# Patient Record
Sex: MALE | Race: OTHER | Employment: UNEMPLOYED | URBAN - METROPOLITAN AREA
[De-identification: names, ages, dates, MRNs, and addresses within clinical notes are randomized per-mention and may not be internally consistent; named-entity substitution may affect disease eponyms.]

---

## 2017-08-31 ENCOUNTER — TELEPHONE (OUTPATIENT)
Dept: SURGERY | Facility: CLINIC | Age: 42
End: 2017-08-31

## 2021-08-16 ENCOUNTER — APPOINTMENT (OUTPATIENT)
Dept: CT IMAGING | Facility: HOSPITAL | Age: 46
DRG: 202 | End: 2021-08-16
Attending: EMERGENCY MEDICINE
Payer: COMMERCIAL

## 2021-08-16 ENCOUNTER — HOSPITAL ENCOUNTER (INPATIENT)
Facility: HOSPITAL | Age: 46
LOS: 4 days | Discharge: HOME | DRG: 202 | End: 2021-08-20
Attending: EMERGENCY MEDICINE | Admitting: HOSPITALIST
Payer: COMMERCIAL

## 2021-08-16 ENCOUNTER — APPOINTMENT (OUTPATIENT)
Dept: GENERAL RADIOLOGY | Facility: HOSPITAL | Age: 46
DRG: 202 | End: 2021-08-16
Attending: EMERGENCY MEDICINE
Payer: COMMERCIAL

## 2021-08-16 DIAGNOSIS — R06.00 DYSPNEA, UNSPECIFIED TYPE: Primary | ICD-10-CM

## 2021-08-16 DIAGNOSIS — R77.8 ELEVATED TROPONIN: ICD-10-CM

## 2021-08-16 PROBLEM — D69.6 THROMBOCYTOPENIA (HCC): Status: ACTIVE | Noted: 2021-08-16

## 2021-08-16 PROBLEM — R73.9 HYPERGLYCEMIA: Status: ACTIVE | Noted: 2021-08-16

## 2021-08-16 PROBLEM — E87.1 HYPONATREMIA: Status: ACTIVE | Noted: 2021-08-16

## 2021-08-16 LAB
ALBUMIN SERPL-MCNC: 3.4 G/DL (ref 3.4–5)
ALBUMIN/GLOB SERPL: 0.7 {RATIO} (ref 1–2)
ALP LIVER SERPL-CCNC: 106 U/L
ALT SERPL-CCNC: 58 U/L
ANION GAP SERPL CALC-SCNC: 3 MMOL/L (ref 0–18)
AST SERPL-CCNC: 50 U/L (ref 15–37)
BASOPHILS # BLD AUTO: 0.02 X10(3) UL (ref 0–0.2)
BASOPHILS NFR BLD AUTO: 0.3 %
BILIRUB SERPL-MCNC: 0.5 MG/DL (ref 0.1–2)
BUN BLD-MCNC: 9 MG/DL (ref 7–18)
CALCIUM BLD-MCNC: 9.4 MG/DL (ref 8.5–10.1)
CHLORIDE SERPL-SCNC: 99 MMOL/L (ref 98–112)
CO2 SERPL-SCNC: 31 MMOL/L (ref 21–32)
CREAT BLD-MCNC: 0.82 MG/DL
D-DIMER: 0.56 UG/ML FEU (ref ?–0.5)
EOSINOPHIL # BLD AUTO: 0.03 X10(3) UL (ref 0–0.7)
EOSINOPHIL NFR BLD AUTO: 0.4 %
ERYTHROCYTE [DISTWIDTH] IN BLOOD BY AUTOMATED COUNT: 15.3 %
FLUAV + FLUBV RNA SPEC NAA+PROBE: NEGATIVE
FLUAV + FLUBV RNA SPEC NAA+PROBE: NEGATIVE
GLOBULIN PLAS-MCNC: 4.6 G/DL (ref 2.8–4.4)
GLUCOSE BLD-MCNC: 121 MG/DL (ref 70–99)
HCT VFR BLD AUTO: 43.8 %
HGB BLD-MCNC: 14.5 G/DL
IMM GRANULOCYTES # BLD AUTO: 0.03 X10(3) UL (ref 0–1)
IMM GRANULOCYTES NFR BLD: 0.4 %
LYMPHOCYTES # BLD AUTO: 1.37 X10(3) UL (ref 1–4)
LYMPHOCYTES NFR BLD AUTO: 19.2 %
M PROTEIN MFR SERPL ELPH: 8 G/DL (ref 6.4–8.2)
MCH RBC QN AUTO: 27.3 PG (ref 26–34)
MCHC RBC AUTO-ENTMCNC: 33.1 G/DL (ref 31–37)
MCV RBC AUTO: 82.3 FL
MONOCYTES # BLD AUTO: 0.65 X10(3) UL (ref 0.1–1)
MONOCYTES NFR BLD AUTO: 9.1 %
NEUTROPHILS # BLD AUTO: 5.03 X10 (3) UL (ref 1.5–7.7)
NEUTROPHILS # BLD AUTO: 5.03 X10(3) UL (ref 1.5–7.7)
NEUTROPHILS NFR BLD AUTO: 70.6 %
NT-PROBNP SERPL-MCNC: 108 PG/ML (ref ?–125)
OSMOLALITY SERPL CALC.SUM OF ELEC: 276 MOSM/KG (ref 275–295)
PLATELET # BLD AUTO: 145 10(3)UL (ref 150–450)
POTASSIUM SERPL-SCNC: 3.6 MMOL/L (ref 3.5–5.1)
RBC # BLD AUTO: 5.32 X10(6)UL
RSV RNA SPEC NAA+PROBE: POSITIVE
SARS-COV-2 RNA RESP QL NAA+PROBE: NOT DETECTED
SARS-COV-2 RNA RESP QL NAA+PROBE: NOT DETECTED
SODIUM SERPL-SCNC: 133 MMOL/L (ref 136–145)
TROPONIN I SERPL-MCNC: 0.1 NG/ML (ref ?–0.04)
WBC # BLD AUTO: 7.1 X10(3) UL (ref 4–11)

## 2021-08-16 PROCEDURE — 93010 ELECTROCARDIOGRAM REPORT: CPT

## 2021-08-16 PROCEDURE — 71045 X-RAY EXAM CHEST 1 VIEW: CPT | Performed by: EMERGENCY MEDICINE

## 2021-08-16 PROCEDURE — 80053 COMPREHEN METABOLIC PANEL: CPT | Performed by: EMERGENCY MEDICINE

## 2021-08-16 PROCEDURE — 85025 COMPLETE CBC W/AUTO DIFF WBC: CPT | Performed by: EMERGENCY MEDICINE

## 2021-08-16 PROCEDURE — 99285 EMERGENCY DEPT VISIT HI MDM: CPT

## 2021-08-16 PROCEDURE — 93005 ELECTROCARDIOGRAM TRACING: CPT

## 2021-08-16 PROCEDURE — 96375 TX/PRO/DX INJ NEW DRUG ADDON: CPT

## 2021-08-16 PROCEDURE — 84484 ASSAY OF TROPONIN QUANT: CPT | Performed by: EMERGENCY MEDICINE

## 2021-08-16 PROCEDURE — 87999 UNLISTED MICROBIOLOGY PX: CPT

## 2021-08-16 PROCEDURE — 87502 INFLUENZA DNA AMP PROBE: CPT | Performed by: EMERGENCY MEDICINE

## 2021-08-16 PROCEDURE — 96374 THER/PROPH/DIAG INJ IV PUSH: CPT

## 2021-08-16 PROCEDURE — 85379 FIBRIN DEGRADATION QUANT: CPT | Performed by: EMERGENCY MEDICINE

## 2021-08-16 PROCEDURE — 71260 CT THORAX DX C+: CPT | Performed by: EMERGENCY MEDICINE

## 2021-08-16 PROCEDURE — 87798 DETECT AGENT NOS DNA AMP: CPT | Performed by: EMERGENCY MEDICINE

## 2021-08-16 PROCEDURE — 94640 AIRWAY INHALATION TREATMENT: CPT

## 2021-08-16 PROCEDURE — 83880 ASSAY OF NATRIURETIC PEPTIDE: CPT | Performed by: EMERGENCY MEDICINE

## 2021-08-16 RX ORDER — ALBUTEROL SULFATE 90 UG/1
8 AEROSOL, METERED RESPIRATORY (INHALATION) 4 TIMES DAILY
Status: DISCONTINUED | OUTPATIENT
Start: 2021-08-16 | End: 2021-08-17

## 2021-08-16 RX ORDER — BENZONATATE 100 MG/1
100 CAPSULE ORAL 3 TIMES DAILY PRN
COMMUNITY

## 2021-08-16 RX ORDER — ENOXAPARIN SODIUM 100 MG/ML
0.5 INJECTION SUBCUTANEOUS DAILY
Status: DISCONTINUED | OUTPATIENT
Start: 2021-08-16 | End: 2021-08-20

## 2021-08-16 RX ORDER — BENAZEPRIL/HYDROCHLOROTHIAZIDE 20 MG-25MG
1 TABLET ORAL DAILY
COMMUNITY
End: 2021-09-28

## 2021-08-16 RX ORDER — METHYLPREDNISOLONE SODIUM SUCCINATE 125 MG/2ML
125 INJECTION, POWDER, LYOPHILIZED, FOR SOLUTION INTRAMUSCULAR; INTRAVENOUS ONCE
Status: COMPLETED | OUTPATIENT
Start: 2021-08-16 | End: 2021-08-16

## 2021-08-16 RX ORDER — ALBUTEROL SULFATE 90 UG/1
AEROSOL, METERED RESPIRATORY (INHALATION)
Status: COMPLETED
Start: 2021-08-16 | End: 2021-08-16

## 2021-08-16 RX ORDER — FLUTICASONE PROPIONATE 50 MCG
2 SPRAY, SUSPENSION (ML) NASAL DAILY
Status: DISCONTINUED | OUTPATIENT
Start: 2021-08-17 | End: 2021-08-20

## 2021-08-16 RX ORDER — LORATADINE 10 MG/1
10 TABLET ORAL DAILY
COMMUNITY

## 2021-08-16 RX ORDER — AMLODIPINE BESYLATE 10 MG/1
10 TABLET ORAL DAILY
COMMUNITY
End: 2021-09-28

## 2021-08-16 RX ORDER — ACETAMINOPHEN 325 MG/1
650 TABLET ORAL EVERY 6 HOURS PRN
Status: DISCONTINUED | OUTPATIENT
Start: 2021-08-16 | End: 2021-08-20

## 2021-08-16 RX ORDER — BENAZEPRIL/HYDROCHLOROTHIAZIDE 20 MG-25MG
1 TABLET ORAL DAILY
Status: DISCONTINUED | OUTPATIENT
Start: 2021-08-17 | End: 2021-08-16 | Stop reason: ALTCHOICE

## 2021-08-16 RX ORDER — AMLODIPINE BESYLATE 5 MG/1
10 TABLET ORAL DAILY
Status: DISCONTINUED | OUTPATIENT
Start: 2021-08-17 | End: 2021-08-20

## 2021-08-16 RX ORDER — LABETALOL HYDROCHLORIDE 5 MG/ML
20 INJECTION, SOLUTION INTRAVENOUS ONCE
Status: COMPLETED | OUTPATIENT
Start: 2021-08-16 | End: 2021-08-16

## 2021-08-16 RX ORDER — BENZONATATE 100 MG/1
100 CAPSULE ORAL 3 TIMES DAILY PRN
Status: DISCONTINUED | OUTPATIENT
Start: 2021-08-16 | End: 2021-08-20

## 2021-08-16 RX ORDER — FLUTICASONE PROPIONATE 50 MCG
2 SPRAY, SUSPENSION (ML) NASAL DAILY
COMMUNITY

## 2021-08-16 RX ORDER — CETIRIZINE HYDROCHLORIDE 10 MG/1
10 TABLET ORAL DAILY
Status: DISCONTINUED | OUTPATIENT
Start: 2021-08-17 | End: 2021-08-20

## 2021-08-16 NOTE — ED PROVIDER NOTES
Patient Seen in: [facility] Emergency Department      History   Patient presents with:  Difficulty Breathing    Stated Complaint: sob 94%, 114    HPI/Subjective:   HPI    14-year-old male complaining of difficulty breathing.   Patient is child has RS Course     Labs Reviewed   COMP METABOLIC PANEL (14) - Abnormal; Notable for the following components:       Result Value    Glucose 121 (*)     Sodium 133 (*)     AST 50 (*)     Globulin  4.6 (*)     A/G Ratio 0.7 (*)     All other components within romel Vonnie Crockett MD on 8/16/2021 at 7:21 PM     CBC and Covid are negative      XR CHEST AP PORTABLE  (CPT=71045)    Result Date: 8/16/2021  CONCLUSION:  1. Mild pulmonary venous congestion.  2. Mild bibasilar airspace disease may represent atelectasis wit elevation his blood pressure and a slight elevation in troponin this may be demand ischemia but will be admitted with pulmonary consult blood pressure came down with labetalol and he felt better after neb treatments Solu-Medrol.   There is no pulmonary embo

## 2021-08-16 NOTE — ED INITIAL ASSESSMENT (HPI)
Pt here due to ILENE. Pt stated that his child had Croup and RSV. PT started having a runny nose and congestion starting on Friday. PT starting having some ILENE and fevers on Saturday. Pt is having body aches and cough.

## 2021-08-17 ENCOUNTER — APPOINTMENT (OUTPATIENT)
Dept: CV DIAGNOSTICS | Facility: HOSPITAL | Age: 46
DRG: 202 | End: 2021-08-17
Attending: INTERNAL MEDICINE
Payer: COMMERCIAL

## 2021-08-17 LAB
ANION GAP SERPL CALC-SCNC: 7 MMOL/L (ref 0–18)
ATRIAL RATE: 101 BPM
ATRIAL RATE: 87 BPM
BASOPHILS # BLD: 0 X10(3) UL (ref 0–0.2)
BASOPHILS NFR BLD: 0 %
BUN BLD-MCNC: 12 MG/DL (ref 7–18)
CALCIUM BLD-MCNC: 9 MG/DL (ref 8.5–10.1)
CHLORIDE SERPL-SCNC: 98 MMOL/L (ref 98–112)
CO2 SERPL-SCNC: 28 MMOL/L (ref 21–32)
CREAT BLD-MCNC: 0.88 MG/DL
EOSINOPHIL # BLD: 0 X10(3) UL (ref 0–0.7)
EOSINOPHIL NFR BLD: 0 %
ERYTHROCYTE [DISTWIDTH] IN BLOOD BY AUTOMATED COUNT: 15.3 %
GLUCOSE BLD-MCNC: 149 MG/DL (ref 70–99)
HCT VFR BLD AUTO: 48.7 %
HGB BLD-MCNC: 15.4 G/DL
LYMPHOCYTES NFR BLD: 0.88 X10(3) UL (ref 1–4)
LYMPHOCYTES NFR BLD: 14 %
MCH RBC QN AUTO: 26.6 PG (ref 26–34)
MCHC RBC AUTO-ENTMCNC: 31.6 G/DL (ref 31–37)
MCV RBC AUTO: 84.1 FL
METAMYELOCYTES # BLD: 0.06 X10(3) UL
METAMYELOCYTES NFR BLD: 1 %
MONOCYTES # BLD: 0.06 X10(3) UL (ref 0.1–1)
MONOCYTES NFR BLD: 1 %
MORPHOLOGY: NORMAL
NEUTROPHILS # BLD AUTO: 4.91 X10 (3) UL (ref 1.5–7.7)
NEUTROPHILS NFR BLD: 77 %
NEUTS BAND NFR BLD: 7 %
NEUTS HYPERSEG # BLD: 5.29 X10(3) UL (ref 1.5–7.7)
OSMOLALITY SERPL CALC.SUM OF ELEC: 279 MOSM/KG (ref 275–295)
P AXIS: 49 DEGREES
P AXIS: 64 DEGREES
P-R INTERVAL: 164 MS
P-R INTERVAL: 174 MS
PLATELET # BLD AUTO: 168 10(3)UL (ref 150–450)
PLATELET MORPHOLOGY: NORMAL
POTASSIUM SERPL-SCNC: 3.9 MMOL/L (ref 3.5–5.1)
Q-T INTERVAL: 394 MS
Q-T INTERVAL: 422 MS
QRS DURATION: 142 MS
QRS DURATION: 148 MS
QTC CALCULATION (BEZET): 507 MS
QTC CALCULATION (BEZET): 510 MS
R AXIS: 219 DEGREES
R AXIS: 256 DEGREES
RBC # BLD AUTO: 5.79 X10(6)UL
SODIUM SERPL-SCNC: 133 MMOL/L (ref 136–145)
T AXIS: 12 DEGREES
T AXIS: 15 DEGREES
TOTAL CELLS COUNTED: 100
TROPONIN I SERPL-MCNC: 0.05 NG/ML (ref ?–0.04)
TROPONIN I SERPL-MCNC: 0.06 NG/ML (ref ?–0.04)
VENTRICULAR RATE: 101 BPM
VENTRICULAR RATE: 87 BPM
WBC # BLD AUTO: 6.3 X10(3) UL (ref 4–11)

## 2021-08-17 PROCEDURE — 93005 ELECTROCARDIOGRAM TRACING: CPT

## 2021-08-17 PROCEDURE — 85025 COMPLETE CBC W/AUTO DIFF WBC: CPT | Performed by: HOSPITALIST

## 2021-08-17 PROCEDURE — 85007 BL SMEAR W/DIFF WBC COUNT: CPT | Performed by: HOSPITALIST

## 2021-08-17 PROCEDURE — 93010 ELECTROCARDIOGRAM REPORT: CPT | Performed by: INTERNAL MEDICINE

## 2021-08-17 PROCEDURE — 84484 ASSAY OF TROPONIN QUANT: CPT | Performed by: HOSPITALIST

## 2021-08-17 PROCEDURE — 76937 US GUIDE VASCULAR ACCESS: CPT

## 2021-08-17 PROCEDURE — 85027 COMPLETE CBC AUTOMATED: CPT | Performed by: HOSPITALIST

## 2021-08-17 PROCEDURE — 94640 AIRWAY INHALATION TREATMENT: CPT

## 2021-08-17 PROCEDURE — 36410 VNPNXR 3YR/> PHY/QHP DX/THER: CPT

## 2021-08-17 PROCEDURE — 93306 TTE W/DOPPLER COMPLETE: CPT | Performed by: INTERNAL MEDICINE

## 2021-08-17 PROCEDURE — 80048 BASIC METABOLIC PNL TOTAL CA: CPT | Performed by: HOSPITALIST

## 2021-08-17 RX ORDER — ALBUTEROL SULFATE 90 UG/1
8 AEROSOL, METERED RESPIRATORY (INHALATION) EVERY 4 HOURS PRN
Status: DISCONTINUED | OUTPATIENT
Start: 2021-08-17 | End: 2021-08-20

## 2021-08-17 RX ORDER — IPRATROPIUM BROMIDE AND ALBUTEROL SULFATE 2.5; .5 MG/3ML; MG/3ML
3 SOLUTION RESPIRATORY (INHALATION)
Status: DISCONTINUED | OUTPATIENT
Start: 2021-08-17 | End: 2021-08-19

## 2021-08-17 RX ORDER — METHYLPREDNISOLONE SODIUM SUCCINATE 125 MG/2ML
60 INJECTION, POWDER, LYOPHILIZED, FOR SOLUTION INTRAMUSCULAR; INTRAVENOUS EVERY 8 HOURS
Status: DISCONTINUED | OUTPATIENT
Start: 2021-08-17 | End: 2021-08-19

## 2021-08-17 RX ORDER — CARVEDILOL 6.25 MG/1
6.25 TABLET ORAL 2 TIMES DAILY WITH MEALS
Status: DISCONTINUED | OUTPATIENT
Start: 2021-08-17 | End: 2021-08-17

## 2021-08-17 RX ORDER — DILTIAZEM HYDROCHLORIDE 60 MG/1
60 TABLET, FILM COATED ORAL EVERY 6 HOURS SCHEDULED
Status: DISCONTINUED | OUTPATIENT
Start: 2021-08-17 | End: 2021-08-18

## 2021-08-17 RX ORDER — ASPIRIN 81 MG/1
81 TABLET ORAL DAILY
Status: DISCONTINUED | OUTPATIENT
Start: 2021-08-17 | End: 2021-08-20

## 2021-08-17 NOTE — CONSULTS
40 Heather Rock Patient Status:  Inpatient    1975 MRN [id number]   [facility] 2NE-A Attending Rosa Maria Douglas MD   Hosp Day # 1 PCP Giorgi Umana MD     Date of Admission: 2021  6:24 PM  Admission Diagnosis: Tab, Take 1 tablet by mouth daily. , Disp: , Rfl:          Current Medications:    Current Facility-Administered Medications:   •  Albuterol Sulfate  (90 Base) MCG/ACT inhaler 8 puff, 8 puff, Inhalation, QID  •  amLODIPine besylate (NORVASC) tab 10 m (!) 156/97 98.6 °F (37 °C) Oral 103 18 92 % — —   08/16/21 2337 (!) 150/99 — — — — — — —   08/16/21 2334 152/83 — — — — — — —   08/16/21 2215 — — — 110 16 94 % — —   08/16/21 2000 137/79 — — 78 18 92 % — —   08/16/21 1945 149/88 — — 75 19 93 % — —   08/16/  08/17/2021    CA 9.0 08/17/2021    ALKPHO 106 08/16/2021    ALT 58 08/16/2021    AST 50 08/16/2021    BILT 0.5 08/16/2021    ALB 3.4 08/16/2021    TP 8.0 08/16/2021     No results found for: PT, INR       Imaging: I have independently visualized

## 2021-08-17 NOTE — PAYOR COMM NOTE
--------------  ADMISSION REVIEW     Payor: [facility]  Subscriber #:  [id number]  Authorization Number: [id number]    Admit date: 8/16/21  Admit time: 11:15 PM       REVIEW DOCUMENTATION:     ED Provider Notes      ED Provider Notes signed Resp 16   Ht 177.8 cm (5' 10\")   Wt (!) 203.7 kg   SpO2 94%   BMI 64.42 kg/m²         Physical Exam    Patient is alert and oriented x3 appears mildly dyspneic O2 sat 94% blood pressure 182/120 HEENT exam within normal limits neck is no lymphadenopathy or EKG    Rate, intervals and axes as noted on EKG Report. Rate: 101  Rhythm: Sinus Rhythm  Reading: Right bundle branch block this is an abnormal EKG              XR CHEST AP PORTABLE  (CPT=71045)    Result Date: 8/16/2021  CONCLUSION:  1.  Mild pulmonar factors. For 6-8mm nodules: In low risk patients, CT in 6 to 12 months, then consider CT in 18 to 24 months. In high risk patients, CT in 6 to 12 months, then obtain CT in 18 to 24 months.    Dictated by (CST): Sultana Dolan MD on 8/16/2021 at 10:13 P 100 MG/ML injection 100 mg     Date Action Dose Route User    8/17/2021 0028 Given 100 mg Subcutaneous (Left Lower Abdomen) Chano Cartagena RN      iohexol (OMNIPAQUE) 350 MG/ML injection 100 mL     Date Action Dose Route User    8/16/2021 2142 Given 1

## 2021-08-17 NOTE — PROGRESS NOTES
Assumed care of pt at 0700. Pt up in chair, reports feeling better. Productive cough continues but secretions clear/think-thin. Pulm still to see. Trop elevated- MD notified and EKG done. No other new orders at this time.  Report given to PATIENTS [facility] RN at 1

## 2021-08-17 NOTE — CONSULTS
[facility] Cardiology  Consultation Note      Vanessa Monahan Patient Status:  Inpatient    1975 MRN [id number]   [facility] [place] 2NE-A Attending Laura Swift MD   Hosp Day # 1 PCP Jadiel Patel MD     Reason for consult: Worth mother; Hypertension in his father. Social History   reports that he has never smoked. He has never used smokeless tobacco. He reports current alcohol use. He reports that he does not use drugs.      Allergies    Amoxicillin             HIVES  Ciprofloxa 08/16/2021    TROP 0.054 08/17/2021       Cardiac diagnostics:    EKG 8/17/2021: Sinus rhythm with sinus arrhythmia with occasional Premature ventricular   complexes Right bundle branch block  Inferior infarct , age undetermined       Impression:  1. RSV i

## 2021-08-17 NOTE — PLAN OF CARE
Assumed care of patient at [phone] from ER. Patient AOX4. Requiring 2L NC to maintain O2 sat >90%. Sinus tachycardic on tele. Hypertensive- received labetalol in ER with improvement. Afebrile. Dyspneic with exertion.  Patient c/o congested cough- PRN Tessalon

## 2021-08-17 NOTE — ED QUICK NOTES
Orders for admission, patient is aware of plan and ready to go upstairs. Any questions, please call ED RN Tabitha Veronica at extension [phone]     Vaccinated?  yes  Type of COVID test sent:rapid  COVID Suspicion level: Low/Highcovid test negative      Titratable drug(s

## 2021-08-17 NOTE — BH PROGRESS NOTE
Seen by dr. Latrice Vallecillo  To be administered  RT  Iv solumedrol as ordered  Cardiology  Consult  Seen by dr. Immanuel Sidhu gated coronaries in am no caffeine for 12 hours; cardizem po q 6 hours  2 d echo today

## 2021-08-17 NOTE — PAYOR COMM NOTE
--------------  CONTINUED STAY REVIEW    Payor: [facility]  Subscriber #:  [id number]  Authorization Number: [id number]    Admit date: 8/16/21  Admit time: 11:15 PM    Admitting Physician: Adilson Clayton MD  Attending Physician:  Raj Bowman No 125 mg Intravenous Ray Jones RN            Plan: 8/17  [facility] Hospitalist H&P         CC: Patient presents with:  Difficulty Breathing        PCP: Salvatore Payne MD     History of Present Illness:  Patient is a 56 yo  hx of HTN, family hx of CAD present Dad- passed away at [de-identified], hx of HTN, CAD         Review of Systems  Negative except for above  General: Denies unintentional weight loss, fevers, or chills  HEENT: Denies vision loss or double vision, denies hearing loss  Cardiovascular: Denies chest pain, 08/16/21 1858   ALT 58   AST 50*   ALB 3.4              Recent Labs   Lab 08/16/21 1858 08/17/21  0701   TROP 0.103* 0.055*         Additional Diagnostics: ECG: NSR with PVC     CXR: image personally reviewed negative     Radiology: XR CHEST AP PORTABLE right and mild left hilar adenopathy. A reference measurement of a right hilar lymph node is 2.6 x 2.2 cm. A reference measurement of a left hilar lymph node is 2.3 x 1.2 cm. CARDIAC:  Unremarkable. PLEURA:  Unremarkable.  CHEST WALL:  Left-sided gynecoma -trend trops, get another ekg- further recs pending cards eval   -BP control         # RSV bronchitis   -liekly cause of SOB, cough, improved with nebs and steroids   -no hx of reactive airway disease/asthma   -stop aleena, cont nebs PRN         #HTN

## 2021-08-17 NOTE — IMAGING NOTE
Spoke with Angelika Basurto RN re planned CTA GATED CORONARY possibly 8/18/21  Instruction given to Angelika Basurto RN  Patient will need HR of 60 BPM or less  IV acces antecubital 18G preferably Right  Patient will need to lay flat for minimum of 15 minutes for scan  GFR in

## 2021-08-17 NOTE — H&P
[facility] Hospitalist H&P       CC: Patient presents with:  Difficulty Breathing       PCP: Pio Dixon MD    History of Present Illness:  Patient is a 54 yo morbidly obese ( obesity class III) , hx of HTN, family hx of CAD presents with exertional dyspnea except for above  General: Denies unintentional weight loss, fevers, or chills  HEENT: Denies vision loss or double vision, denies hearing loss  Cardiovascular: Denies chest pain, palpitations, peripheral edema  Pulmonary: Denies cough, shortness of breath 0.055*       Additional Diagnostics: ECG: NSR with PVC    CXR: image personally reviewed negative    Radiology: XR CHEST AP PORTABLE  (CPT=71045)    Result Date: 8/16/2021  PROCEDURE:  XR CHEST AP PORTABLE  (CPT=71045)  TECHNIQUE:  AP chest radiograph was measurement of a left hilar lymph node is 2.3 x 1.2 cm. CARDIAC:  Unremarkable. PLEURA:  Unremarkable. CHEST WALL:  Left-sided gynecomastia. LIMITED ABDOMEN:  Fatty infiltration of the liver. BONES:  Degenerative changes in the spine. OTHER:  None. cough, improved with nebs and steroids   -no hx of reactive airway disease/asthma   -stop sterouds, cont nebs PRN       #HTN   -ccont amlodipine, ace and hydrochlorothiazide   -titrate - goal BP < 120     # Morbid obestiy   -class III BMI 64   -diet and li

## 2021-08-18 LAB
ANION GAP SERPL CALC-SCNC: 7 MMOL/L (ref 0–18)
BASOPHILS # BLD AUTO: 0.02 X10(3) UL (ref 0–0.2)
BASOPHILS NFR BLD AUTO: 0.2 %
BUN BLD-MCNC: 18 MG/DL (ref 7–18)
CALCIUM BLD-MCNC: 9.3 MG/DL (ref 8.5–10.1)
CHLORIDE SERPL-SCNC: 98 MMOL/L (ref 98–112)
CO2 SERPL-SCNC: 31 MMOL/L (ref 21–32)
CREAT BLD-MCNC: 0.8 MG/DL
EOSINOPHIL # BLD AUTO: 0 X10(3) UL (ref 0–0.7)
EOSINOPHIL NFR BLD AUTO: 0 %
ERYTHROCYTE [DISTWIDTH] IN BLOOD BY AUTOMATED COUNT: 15.3 %
GLUCOSE BLD-MCNC: 170 MG/DL (ref 70–99)
HCT VFR BLD AUTO: 46.6 %
HGB BLD-MCNC: 15.4 G/DL
IMM GRANULOCYTES # BLD AUTO: 0.05 X10(3) UL (ref 0–1)
IMM GRANULOCYTES NFR BLD: 0.5 %
LYMPHOCYTES # BLD AUTO: 1.54 X10(3) UL (ref 1–4)
LYMPHOCYTES NFR BLD AUTO: 16.9 %
MCH RBC QN AUTO: 27.2 PG (ref 26–34)
MCHC RBC AUTO-ENTMCNC: 33 G/DL (ref 31–37)
MCV RBC AUTO: 82.2 FL
MONOCYTES # BLD AUTO: 0.16 X10(3) UL (ref 0.1–1)
MONOCYTES NFR BLD AUTO: 1.8 %
NEUTROPHILS # BLD AUTO: 7.35 X10 (3) UL (ref 1.5–7.7)
NEUTROPHILS # BLD AUTO: 7.35 X10(3) UL (ref 1.5–7.7)
NEUTROPHILS NFR BLD AUTO: 80.6 %
OSMOLALITY SERPL CALC.SUM OF ELEC: 288 MOSM/KG (ref 275–295)
PLATELET # BLD AUTO: 191 10(3)UL (ref 150–450)
POTASSIUM SERPL-SCNC: 4 MMOL/L (ref 3.5–5.1)
RBC # BLD AUTO: 5.67 X10(6)UL
SODIUM SERPL-SCNC: 136 MMOL/L (ref 136–145)
WBC # BLD AUTO: 9.1 X10(3) UL (ref 4–11)

## 2021-08-18 PROCEDURE — 94640 AIRWAY INHALATION TREATMENT: CPT

## 2021-08-18 PROCEDURE — 80048 BASIC METABOLIC PNL TOTAL CA: CPT | Performed by: INTERNAL MEDICINE

## 2021-08-18 PROCEDURE — 85025 COMPLETE CBC W/AUTO DIFF WBC: CPT | Performed by: HOSPITALIST

## 2021-08-18 NOTE — PLAN OF CARE
Assumed care of patient at [facility]. Patient AOX4. Requiring 2L NC to maintain O2 sat >90% while awake, 3L NC while asleep. NSR/sinus clarice on tele. VSS. Denies pain. Expiratory wheezes auscultated bilaterally.  Continuing IV Solu-medrol, nebs, PRN albuterol in indicated  - Manage/alleviate anxiety  - Monitor for signs/symptoms of CO2 retention  8/18/2021 0040 by Eliot Ayala RN  Outcome: Progressing  8/18/2021 0039 by Eliot Ayala RN  Outcome: Progressing     Problem: CARDIOVASCULAR - ADULT  Goal:

## 2021-08-18 NOTE — PLAN OF CARE
Denies c/o pain, malaise, or cardiac symptoms, despite 2 sinus pauses this morning.   Remains in SB-NSR, 2 pauses this am.  One at 1013: 3.37 seconds and one at 1019: 5.32 seconds, with a couple of juncitonal escape beats following completely asymptomatic a behavior  - Position to facilitate oxygenation and minimize respiratory effort  - Oxygen supplementation based on oxygen saturation or ABGs  - Provide Smoking Cessation handout, if applicable  - Encourage broncho-pulmonary hygiene including cough, deep liudmila

## 2021-08-18 NOTE — PAYOR COMM NOTE
--------------  CONTINUED STAY REVIEW    Payor: [address] [facility]  Subscriber #:  [id number]  Authorization Number: [id number]    Admit date: 8/16/21  Admit time: 11:15 PM    FAXING CLINICAL UPDATE FOR 8/18/21 8/18/21   SUBJECTIVE:  Sitting up i nodule on CT chest- f/u pulm/pcp outpatient     Prophy:  DVT: lovenox     MEDICATIONS ADMINISTERED IN LAST 1 DAY:  acetaminophen (TYLENOL) tab 650 mg     Date Action Dose Route User    8/17/2021 1757 Given 650 mg Oral KAILEY Neff mL Nebulization Angelia Rojas, RCP      methylPREDNISolone Sodium Succ (Solu-MEDROL) injection 60 mg     Date Action Dose Route User    8/18/2021 1455 Given 60 mg Intravenous Judith Hercules RN    8/18/2021 2062 Given 60 mg Intravenous Robin Alanis RN

## 2021-08-18 NOTE — PROGRESS NOTES
Pulmonary Progress Note      NAME: Emanuel Celis - ROOM: [id number] - MRN: [id number] - Age: 55year old - : 1975    Assessment/Plan:  Cough/SOB: 2/2 RSV with associated bronchospasm  -viral panel with +RSV  -IV steroids, start weaning tomorrow RDW 15.3   NEPRELIM 7.35   WBC 9.1   .0     Recent Labs   Lab 08/16/21  1858 08/17/21  0701 08/18/21  0633   * 149* 170*   BUN 9 12 18   CREATSERUM 0.82 0.88 0.80   GFRAA 123 119 124   GFRNAA 106 103 107   CA 9.4 9.0 9.3   ALB 3.4  --   --

## 2021-08-18 NOTE — PROGRESS NOTES
[facility] Hospitalist Progress Note     PCP: Deb Rondon MD    CC:  Follow up    SUBJECTIVE:  Sitting up in chair, on 2L NC. Breathing better.  No n/v/f/c.     OBJECTIVE:  Temp:  [97.5 °F (36.4 °C)-98.3 °F (36.8 °C)] 97.5 °F (36.4 °C)  Pulse:  [45-84] 69  R mg Oral Daily   • enoxaparin  0.5 mg/kg Subcutaneous Daily   • lisinopril-hydrochlorothiazide (ZESTORETIC 20/25) combination tablet ([facility] only)   Oral Daily       Albuterol Sulfate HFA, benzonatate, acetaminophen       Assessment/Plan:     Principal Problem

## 2021-08-19 LAB
ANION GAP SERPL CALC-SCNC: 5 MMOL/L (ref 0–18)
BASOPHILS # BLD AUTO: 0.02 X10(3) UL (ref 0–0.2)
BASOPHILS NFR BLD AUTO: 0.1 %
BUN BLD-MCNC: 22 MG/DL (ref 7–18)
CALCIUM BLD-MCNC: 9.3 MG/DL (ref 8.5–10.1)
CHLORIDE SERPL-SCNC: 98 MMOL/L (ref 98–112)
CO2 SERPL-SCNC: 31 MMOL/L (ref 21–32)
CREAT BLD-MCNC: 1.11 MG/DL
EOSINOPHIL # BLD AUTO: 0 X10(3) UL (ref 0–0.7)
EOSINOPHIL NFR BLD AUTO: 0 %
ERYTHROCYTE [DISTWIDTH] IN BLOOD BY AUTOMATED COUNT: 15.4 %
EST. AVERAGE GLUCOSE BLD GHB EST-MCNC: 151 MG/DL (ref 68–126)
GLUCOSE BLD-MCNC: 152 MG/DL (ref 70–99)
HAV IGM SER QL: 2.5 MG/DL (ref 1.6–2.6)
HBA1C MFR BLD HPLC: 6.9 % (ref ?–5.7)
HCT VFR BLD AUTO: 49.5 %
HGB BLD-MCNC: 15.7 G/DL
IMM GRANULOCYTES # BLD AUTO: 0.11 X10(3) UL (ref 0–1)
IMM GRANULOCYTES NFR BLD: 0.7 %
LYMPHOCYTES # BLD AUTO: 2.05 X10(3) UL (ref 1–4)
LYMPHOCYTES NFR BLD AUTO: 12.9 %
MCH RBC QN AUTO: 26.8 PG (ref 26–34)
MCHC RBC AUTO-ENTMCNC: 31.7 G/DL (ref 31–37)
MCV RBC AUTO: 84.6 FL
MONOCYTES # BLD AUTO: 0.47 X10(3) UL (ref 0.1–1)
MONOCYTES NFR BLD AUTO: 3 %
NEUTROPHILS # BLD AUTO: 13.23 X10 (3) UL (ref 1.5–7.7)
NEUTROPHILS # BLD AUTO: 13.23 X10(3) UL (ref 1.5–7.7)
NEUTROPHILS NFR BLD AUTO: 83.3 %
OSMOLALITY SERPL CALC.SUM OF ELEC: 284 MOSM/KG (ref 275–295)
PLATELET # BLD AUTO: 225 10(3)UL (ref 150–450)
POTASSIUM SERPL-SCNC: 3.5 MMOL/L (ref 3.5–5.1)
POTASSIUM SERPL-SCNC: 4.5 MMOL/L (ref 3.5–5.1)
RBC # BLD AUTO: 5.85 X10(6)UL
SODIUM SERPL-SCNC: 134 MMOL/L (ref 136–145)
WBC # BLD AUTO: 15.9 X10(3) UL (ref 4–11)

## 2021-08-19 PROCEDURE — 85025 COMPLETE CBC W/AUTO DIFF WBC: CPT | Performed by: HOSPITALIST

## 2021-08-19 PROCEDURE — 80048 BASIC METABOLIC PNL TOTAL CA: CPT | Performed by: HOSPITALIST

## 2021-08-19 PROCEDURE — 94640 AIRWAY INHALATION TREATMENT: CPT

## 2021-08-19 PROCEDURE — 83735 ASSAY OF MAGNESIUM: CPT | Performed by: HOSPITALIST

## 2021-08-19 PROCEDURE — 84132 ASSAY OF SERUM POTASSIUM: CPT | Performed by: HOSPITALIST

## 2021-08-19 PROCEDURE — 83036 HEMOGLOBIN GLYCOSYLATED A1C: CPT | Performed by: INTERNAL MEDICINE

## 2021-08-19 RX ORDER — HYDROCHLOROTHIAZIDE 25 MG/1
25 TABLET ORAL DAILY
Status: DISCONTINUED | OUTPATIENT
Start: 2021-08-19 | End: 2021-08-20

## 2021-08-19 RX ORDER — LISINOPRIL 40 MG/1
40 TABLET ORAL DAILY
Status: DISCONTINUED | OUTPATIENT
Start: 2021-08-19 | End: 2021-08-20

## 2021-08-19 RX ORDER — IPRATROPIUM BROMIDE AND ALBUTEROL SULFATE 2.5; .5 MG/3ML; MG/3ML
3 SOLUTION RESPIRATORY (INHALATION)
Status: DISCONTINUED | OUTPATIENT
Start: 2021-08-19 | End: 2021-08-20

## 2021-08-19 RX ORDER — ASPIRIN 81 MG/1
81 TABLET ORAL DAILY
Qty: 90 TABLET | Refills: 3 | Status: SHIPPED | OUTPATIENT
Start: 2021-08-20

## 2021-08-19 RX ORDER — PREDNISONE 20 MG/1
40 TABLET ORAL
Status: DISCONTINUED | OUTPATIENT
Start: 2021-08-19 | End: 2021-08-20

## 2021-08-19 RX ORDER — POTASSIUM CHLORIDE 20 MEQ/1
40 TABLET, EXTENDED RELEASE ORAL EVERY 4 HOURS
Status: COMPLETED | OUTPATIENT
Start: 2021-08-19 | End: 2021-08-19

## 2021-08-19 NOTE — PROGRESS NOTES
[facility]  Cardiology Progress Note    Edna Trevizo Patient Status:  Inpatient    1975 MRN [id number]   [facility] 2NE-A Attending Allyson Bingham, *   Hosp Day # 2 PCP Hernan Machado MD       Subjective: SOB improved apparent distress. No respiratory or constitutional distress. HEENT: Normocephalic, anicteric sclera  Neck: No JVD  Cardiac: Regular rate and rhythm. S1, S2 normal. No murmur, rubs or gallops. Lungs: Clear without wheezes, rales, rhonchi or dullness.   Dianne Choi the coronary CTA. Consider stress echo once he has recovered from his current respiratory infection. 2. Stop diltiazem, was started to slow HR down for the CCTA, but he has had up to 5 second sinus pauses (while asleep).    3. If recurrent long pauses zita

## 2021-08-19 NOTE — CONSULTS
[facility]    PATIENT'S NAME: Frank Alvarado   ATTENDING PHYSICIAN: Yaz Azevedo. Becca Smyth MD   CONSULTING PHYSICIAN: Rayne Smiley M.D.    PATIENT ACCOUNT#:   [de-identified]    LOCATION:  [address] [place] [address]  MEDICAL RECORD #:   [id number]       DATE OF CONRADO mucosa is moist.  NECK:  Very full. Difficult to evaluate but no carotid bruits. Hard to appreciate JVD. LUNGS:  Diminished breath sounds in the bases and in general coarse breath sounds.   HEART:  Regular rate and rhythm without extra heart sounds or mu

## 2021-08-19 NOTE — PROGRESS NOTES
[facility]  Cardiology Progress Note    Gemini Epp Patient Status:  Inpatient    1975 MRN [id number]   [facility] 2NE-A Attending Kb Betancur, *   Hosp Day # 3 PCP Yung Smith MD       Subjective: SOB improved Alert and oriented x 3. No apparent distress. No respiratory or constitutional distress. Morbidly obese  HEENT: Normocephalic, anicteric sclera  Neck: No discernible JVD  Cardiac: Regular rate and rhythm. S1, S2 normal. No murmur, rubs or gallops.    Lungs: asleep or awake     Recommendations:  1. Suspect minimal troponin elevation is from demand ischemia due to current RSV infection. Too big for Cardiolite. Can't lay flat for the coronary CTA.  Consider stress echo once he has recovered from his current respi

## 2021-08-19 NOTE — PLAN OF CARE
Assumed care of patient 2330 sitting up in chair. AO x4. NSR/SB on tele monitor with occasional pauses. O2 sat adequate on RA. Denies chest pain and shortness of breath. Endorses Xiao and non productive cough. Plan of care updated with patient.  Chair locked decreased cardiac output  - Evaluate effectiveness of vasoactive medications to optimize hemodynamic stability  - Monitor arterial and/or venous puncture sites for bleeding and/or hematoma  - Assess quality of pulses, skin color and temperature  - Assess f

## 2021-08-19 NOTE — PAYOR COMM NOTE
--------------  CONTINUED STAY REVIEW    Payor: [address] [facility]  Subscriber #:  [id number]  Authorization Number: [id number]    Admit date: 8/16/21  Admit time: 11:15 PM    FAXING CLINICAL UPDATE FOR 8/19/21 8/19/21  SUBJECTIVE:  Sitting up i surgery eval as OP  -follow up with pcp  -sleep study outpatient to assess for marquita given body habitus     **incidental finding of hilar adenopathy and pulmonary nodule on CT chest- f/u pulm/pcp outpatient     Prophy:  DVT: lovenox        MEDICATIONS ADMINI 1238 Given 40 mEq Oral Jessa Sandoval RN    8/19/2021 6124 Given 40 mEq Oral Ken Calle, RN      predniSONE (DELTASONE) tab 40 mg     Date Action Dose Route User    8/19/2021 1239 Given 40 mg Oral Jessa Sandoval RN

## 2021-08-19 NOTE — PROGRESS NOTES
[facility] Hospitalist Progress Note     PCP: Carlee Thomason MD    CC:  Follow up    SUBJECTIVE:  Sitting up in chair, on ra. Breathing better. No n/v/f/c.    Had some more pauses this am, thinks he was nodding off while sitting in chair  OBJECTIVE:  Temp:  [id number] • potassium chloride  40 mEq Oral Q4H   • predniSONE  40 mg Oral Daily with breakfast   • aspirin  81 mg Oral Daily   • amLODIPine besylate  10 mg Oral Daily   • Fluticasone Propionate  2 spray Each Nare Daily   • cetirizine  10 mg Oral Daily   • enoxapa

## 2021-08-19 NOTE — PLAN OF CARE
Pt is alert x 4, on Ra, NSR, with occasional pauses on the monitor, cardiology . PT denies any cardiovascular symptoms at this time. Pt is up with SBA, continent of B/B. All needs met and will continue to monitor.      9136: Pt had a 4.01 sec pause, cabrera bleeding, hypotension and signs of decreased cardiac output  - Evaluate effectiveness of vasoactive medications to optimize hemodynamic stability  - Monitor arterial and/or venous puncture sites for bleeding and/or hematoma  - Assess quality of pulses, ski

## 2021-08-19 NOTE — PROGRESS NOTES
40 Heather Arnold Patient Status:  Inpatient    1975 MRN [id number]   [facility] 2NE-A Attending Zenia Diop, *   Hosp Day # 3 PCP Mary Peterson MD     SUBJECTIVE: Pt states that SOB is improved.       OBJ rhythm, normal S1S2                          Abdomen: soft, non-tender, non-distended, positive BS.                         Extremity: No clubbing or cyanosis. + edema                          Skin: No rashes or lesions.        Lab Results   Component Valu

## 2021-08-20 VITALS
WEIGHT: 315 LBS | HEART RATE: 59 BPM | RESPIRATION RATE: 16 BRPM | SYSTOLIC BLOOD PRESSURE: 130 MMHG | DIASTOLIC BLOOD PRESSURE: 89 MMHG | BODY MASS INDEX: 45.1 KG/M2 | TEMPERATURE: 98 F | OXYGEN SATURATION: 94 % | HEIGHT: 70 IN

## 2021-08-20 LAB
BASOPHILS # BLD AUTO: 0.03 X10(3) UL (ref 0–0.2)
BASOPHILS NFR BLD AUTO: 0.2 %
EOSINOPHIL # BLD AUTO: 0 X10(3) UL (ref 0–0.7)
EOSINOPHIL NFR BLD AUTO: 0 %
ERYTHROCYTE [DISTWIDTH] IN BLOOD BY AUTOMATED COUNT: 15.5 %
HAV IGM SER QL: 2.4 MG/DL (ref 1.6–2.6)
HCT VFR BLD AUTO: 48.1 %
HGB BLD-MCNC: 14.9 G/DL
IMM GRANULOCYTES # BLD AUTO: 0.12 X10(3) UL (ref 0–1)
IMM GRANULOCYTES NFR BLD: 0.8 %
LYMPHOCYTES # BLD AUTO: 2.57 X10(3) UL (ref 1–4)
LYMPHOCYTES NFR BLD AUTO: 18 %
MCH RBC QN AUTO: 26.2 PG (ref 26–34)
MCHC RBC AUTO-ENTMCNC: 31 G/DL (ref 31–37)
MCV RBC AUTO: 84.5 FL
MONOCYTES # BLD AUTO: 1.01 X10(3) UL (ref 0.1–1)
MONOCYTES NFR BLD AUTO: 7.1 %
NEUTROPHILS # BLD AUTO: 10.54 X10 (3) UL (ref 1.5–7.7)
NEUTROPHILS # BLD AUTO: 10.54 X10(3) UL (ref 1.5–7.7)
NEUTROPHILS NFR BLD AUTO: 73.9 %
PLATELET # BLD AUTO: 232 10(3)UL (ref 150–450)
POTASSIUM SERPL-SCNC: 4.1 MMOL/L (ref 3.5–5.1)
RBC # BLD AUTO: 5.69 X10(6)UL
WBC # BLD AUTO: 14.3 X10(3) UL (ref 4–11)

## 2021-08-20 PROCEDURE — 94640 AIRWAY INHALATION TREATMENT: CPT

## 2021-08-20 PROCEDURE — 84132 ASSAY OF SERUM POTASSIUM: CPT | Performed by: HOSPITALIST

## 2021-08-20 PROCEDURE — 83735 ASSAY OF MAGNESIUM: CPT | Performed by: HOSPITALIST

## 2021-08-20 PROCEDURE — 85025 COMPLETE CBC W/AUTO DIFF WBC: CPT | Performed by: HOSPITALIST

## 2021-08-20 RX ORDER — ALBUTEROL SULFATE 90 UG/1
2 AEROSOL, METERED RESPIRATORY (INHALATION) EVERY 4 HOURS PRN
Qty: 1 EACH | Refills: 0 | Status: SHIPPED | OUTPATIENT
Start: 2021-08-20

## 2021-08-20 RX ORDER — PREDNISONE 20 MG/1
TABLET ORAL
Qty: 30 TABLET | Refills: 0 | Status: SHIPPED | OUTPATIENT
Start: 2021-08-20

## 2021-08-20 RX ORDER — IPRATROPIUM BROMIDE AND ALBUTEROL SULFATE 2.5; .5 MG/3ML; MG/3ML
3 SOLUTION RESPIRATORY (INHALATION) EVERY 6 HOURS PRN
Qty: 30 EACH | Refills: 0 | Status: SHIPPED | OUTPATIENT
Start: 2021-08-20

## 2021-08-20 NOTE — PROGRESS NOTES
08/19/21 2330 08/19/21 2331 08/19/21 2332   Oxygen Therapy   SpO2 (!) 87 % (!) 78 % (!) 80 %   O2 Device None (Room air) None (Room air) None (Room air)      08/19/21 2333 08/19/21 2334 08/19/21 2335   Oxygen Therapy   SpO2 (!) 79 % (!) 81 % (!) 80 %

## 2021-08-20 NOTE — PROGRESS NOTES
40 Heather Eliu Rock Patient Status:  Inpatient    1975 MRN [id number]   [facility] 2NE-A Attending Gio Duvall, *   Hosp Day # 4 PCP Laura Coronado MD     SUBJECTIVE: doing much better.   O2 added due to gutierrez MCV 84.5 08/20/2021    MCH 26.2 08/20/2021    MCHC 31.0 08/20/2021    RDW 15.5 08/20/2021    .0 08/20/2021     Lab Results   Component Value Date    K 4.1 08/20/2021     No results found for: PT, INR       ASSESSMENT/PLAN:  1.  Cough/SOB: 2/2 RSV wit

## 2021-08-20 NOTE — PLAN OF CARE
Pt is alert x 4, on Ra during the day and 2 liters over night, NSR on the monitor. PT denies any cardiovascular symptoms at this time. Pt is up ad cassi in the room,  continent of B/B. All needs met and will continue to monitor.        PLAN; Continue to monit for bleeding, hypotension and signs of decreased cardiac output  - Evaluate effectiveness of vasoactive medications to optimize hemodynamic stability  - Monitor arterial and/or venous puncture sites for bleeding and/or hematoma  - Assess quality of pulses,

## 2021-08-20 NOTE — PROGRESS NOTES
[facility]  Cardiology Progress Note    Lydia Weaver Patient Status:  Inpatient    1975 MRN [id number]   [facility] 2NE-A Attending Monique Coffey, *   Hosp Day # 4 PCP Michelle Bach MD       Subjective: SOB improved sclera  Neck: No discernible JVD  Cardiac: Regular rate and rhythm. S1, S2 normal. No murmur, rubs or gallops. Lungs: Clear without wheezes, rales, rhonchi or dullness. Normal excursions and effort. Abdomen: Soft, non-tender. BS-present.   Extremities: apnea  4. Mildly elevated troponin  5. Sinus pauses up to 5 seconds, ? While asleep or awake     Recommendations:  1. Suspect minimal troponin elevation is from demand ischemia due to current RSV infection. Too big for Cardiolite.  Can't lay flat for the co

## 2021-08-20 NOTE — CM/SW NOTE
Received order for home nebulizer machine. IVELISSE sent order and supporting documentation to Sanjana in [facility]. Await approval/confirmation of order.      XIOMARA Khan, [facility]   / Discharge Planner  [id number]

## 2021-08-20 NOTE — PLAN OF CARE
Assumed care of patient 1930 sitting in chair, with family at bedside. AO x4. NSR/SB on tele monitor. O2 sat adequate on room air. Denies chest pain and shortness of breath. Still with non productive, dry cough.  Updated plan of care, including need for O2 hypotension and signs of decreased cardiac output  - Evaluate effectiveness of vasoactive medications to optimize hemodynamic stability  - Monitor arterial and/or venous puncture sites for bleeding and/or hematoma  - Assess quality of pulses, skin color an

## 2021-08-20 NOTE — PROGRESS NOTES
[facility] Hospitalist Progress Note     PCP: Laura Coronado MD    CC:  Follow up    SUBJECTIVE:  Sitting up in chair, on ra. Breathing better. No n/v/f/c. Feels good.  One pause overnight- d/w RN  OBJECTIVE:  Temp:  [97.4 °F (36.3 °C)-98.7 °F (37.1 °C)] 97 Recent Labs   Lab 08/16/21  1858 08/17/21  0701 08/17/21  1329   TROP 0.103* 0.055* 0.054*          Meds:     • ipratropium-albuterol  3 mL Nebulization Q6H WA   • lisinopril  40 mg Oral Daily   • hydrochlorothiazide  25 mg Oral Daily   • predniSONE  4 [facility]  Answering Service number: [phone]

## 2021-08-21 NOTE — DISCHARGE SUMMARY
General Medicine Discharge Summary     Patient ID:  Dano Easton  55year old  [id number]  7/31/1975    Admit date: 8/16/2021    Discharge date and time: 8/20/2021  8:13 PM     Attending Physician:  Lashay Vallejo MD    Primary Care Physician: Sary Meier SOCIAL WORK  IP CONSULT TO SOCIAL WORK    Radiology:  CARD ECHO 2D DOPPLER ([facility]=[id number])    Result Date: 8/17/2021                                                     *[address] parameters are consistent with abnormal    left ventricular relaxation - grade 1 diastolic dysfunction. 2. Right ventricle: Systolic function was normal. 3. No hemodynamically significant valve dysfunction.  Impressions:  No previous study was available for ID, ED, PLAX                         4.7   cm     4.2 - 5.9  LV ID, ES, PLAX                         3.4   cm     ---------  LV fx shortening, PLAX                  29    %      25 - 43  LV mid-wall fx shortening, PLAX (L)     12    %      14 - 22  LV PW t congestion. Mild bibasilar airspace disease. Mild blunting the right costophrenic angle. No measurable pneumothorax. CONCLUSION:  1. Mild pulmonary venous congestion.  2. Mild bibasilar airspace disease may represent atelectasis with a superim follow-up contrast-enhanced chest CT in 3 months is recommended for further characterization. 3. Fatty infiltration of the liver. 4. There is a 7 mm nodule within the right middle lobe. Please see the recommendation below.    The findings include a single, by mouth 3 (three) times daily as needed for cough., Historical    loratadine 10 MG Oral Tab  Take 10 mg by mouth daily. , Historical    Fluticasone Propionate 50 MCG/ACT Nasal Suspension  2 sprays by Each Nare route daily.   , Historical    amLODIPine besyl

## 2021-08-27 NOTE — PAYOR COMM NOTE
--------------  DISCHARGE REVIEW    Payor: [address]  Subscriber #:  [id number]  Authorization Number: [id number]    Admit date: 8/16/21  Admit time:  11:15 PM  Discharge Date: 8/20/2021  8:13 PM     Admitting Physician: Sourav Harrington MD [id number] recommended to assess marquita, weight loss     # RSV bronchitis, bronchospasm  -liekly cause of SOB, cough  -nebs, steroids  -pulmonary on, appreciate     #HTN -accelerated HTN  -cont amlodipine, ace and hydrochlorothiazide -adjustments per cards of valvular function. Image quality was suboptimal. The study was technically limited due to poor acoustic window availability and body habitus. Intravenous contrast (Definity) was administered to opacify the LV.  ECG rhythm:   Normal sinus ---------------- velocity was within the normal range. There was no evidence for stenosis. There was no significant regurgitation. Pericardium:  There was no pericardial effusion. Aorta: Aortic root: The aortic root was normal. Ascending aorta:  The ascending aorta was nor ---------------------------------------------------------------------------- Prepared and electronically signed by Joaquina Cuevas MD. 08/17/2021 16:49     XR CHEST AP PORTABLE  (CPT=71045)    Result Date: 8/16/2021  PROCEDURE:  XR CHEST AP PORTABLE  (CPT=7104 node is 2.6 x 2.2 cm. A reference measurement of a left hilar lymph node is 2.3 x 1.2 cm. CARDIAC:  Unremarkable. PLEURA:  Unremarkable. CHEST WALL:  Left-sided gynecomastia. LIMITED ABDOMEN:  Fatty infiltration of the liver.  BONES:  Degenerative changes Disp-1 each, R-0    predniSONE 20 MG Oral Tab  4 pills daily x 3 days, then 3 pills daily x 3 days, then 2 pills daily x 3 days, then 1 pill daily x 3 days, then end., Normal, Disp-30 tablet, R-0    ipratropium-albuterol 0.5-2.5 (3) MG/3ML Inhalation Solut [place] [address] [place] [id number]  [phone]          Follow up with Andreina Guillen NP  Thursday Sep 2, 2021  @ 10:00am for cardiology follow up [address] [place] [address] [place] [id number]

## 2021-09-30 PROBLEM — I45.5 SINUS PAUSE: Status: ACTIVE | Noted: 2021-09-30

## 2021-09-30 PROBLEM — I10 ESSENTIAL HYPERTENSION: Status: ACTIVE | Noted: 2021-09-30

## 2021-09-30 PROBLEM — E66.01 MORBID OBESITY (HCC): Status: ACTIVE | Noted: 2019-12-19
